# Patient Record
Sex: MALE | Race: BLACK OR AFRICAN AMERICAN
[De-identification: names, ages, dates, MRNs, and addresses within clinical notes are randomized per-mention and may not be internally consistent; named-entity substitution may affect disease eponyms.]

---

## 2019-07-11 ENCOUNTER — HOSPITAL ENCOUNTER (EMERGENCY)
Dept: HOSPITAL 62 - ER | Age: 49
Discharge: HOME | End: 2019-07-11
Payer: SELF-PAY

## 2019-07-11 VITALS — DIASTOLIC BLOOD PRESSURE: 99 MMHG | SYSTOLIC BLOOD PRESSURE: 151 MMHG

## 2019-07-11 DIAGNOSIS — R20.0: ICD-10-CM

## 2019-07-11 DIAGNOSIS — R51: ICD-10-CM

## 2019-07-11 DIAGNOSIS — I10: ICD-10-CM

## 2019-07-11 DIAGNOSIS — M25.512: Primary | ICD-10-CM
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
## 2019-07-11 PROCEDURE — 99283 EMERGENCY DEPT VISIT LOW MDM: CPT

## 2019-07-11 PROCEDURE — 73030 X-RAY EXAM OF SHOULDER: CPT

## 2019-07-11 PROCEDURE — 96372 THER/PROPH/DIAG INJ SC/IM: CPT

## 2019-07-11 PROCEDURE — 73010 X-RAY EXAM OF SHOULDER BLADE: CPT

## 2019-07-11 NOTE — ER DOCUMENT REPORT
HPI





- HPI


Time Seen by Provider: 07/11/19 11:44


Pain Level: 5


Context: 





Patient is a 49-year-old male who presents to the emergency department with a 

chief complaint of left scapula and left arm numbness and tingling.  Patient 

states that this has been ongoing for over 6 months.  Patient states he works in

[employer] and does a lot of heavy repetitive lifting.  Patient states that 

movement makes the left shoulder and scapula pain worse.  Patient states that 

when he lifts his arm above his head it seems to improve the numbness and 

tingling as well as the pain.  Patient states he is attempted to use Flexeril as

well as Tylenol and ibuprofen with minimal relief.  Patient states he has 

continued to work in [employer] daily.  Patient states he does have a history of 

chronic low back pain in which she sees [facility] surgery and receives

injections.  Patient denies neck or upper back surgery in the past.  Patient 

denies a specific injury or fall.





- CONSTITUTIONAL


Constitutional: DENIES: Fever, Chills





- EENT


EENT: DENIES: Sore Throat, Ear Pain, Eye problems





- NEURO


Neurology: REPORTS: Headache.  DENIES: Weakness, Vision blurred, Dizzinesss / 

Vertigo





- CARDIOVASCULAR


Cardiovascular: DENIES: Chest pain





- RESPIRATORY


Respiratory: DENIES: Trouble Breathing, Coughing





- GASTROINTESTINAL


Gastrointestinal: DENIES: Abdominal Pain, Black / Bloody Stools





- URINARY


Urinary: DENIES: Dysuria, Urgency, Frequency





- REPRODUCTIVE


Reproductive: DENIES: Pregnant:





- MUSCULOSKELETAL


Musculoskeletal: REPORTS: Extremity pain





Past Medical History





- General


Information source: Patient





- Social History


Smoking Status: Unknown if Ever Smoked


Chew tobacco use (# tins/day): No


Frequency of alcohol use: 1- 2 PER DAY


Drug Abuse: None


Family History: None, Reviewed & Not Pertinent


Patient has suicidal ideation: No


Patient has homicidal ideation: No





- Past Medical History


Cardiac Medical History: Reports: Hx Hypertension


   Denies: Hx Congestive Heart Failure, Hx Heart Attack


Pulmonary Medical History: Reports: None


   Denies: Hx Asthma, Hx Bronchitis, Hx COPD, Hx Pneumonia, Hx Tuberculosis


EENT Medical History: Reports: None


Neurological Medical History: Reports: None.  Denies: Hx Seizures


Endocrine Medical History: Reports: None


Renal/ Medical History: Reports: None.  Denies: Hx Benign Prostatic H

yperplasia, Hx End Stage Renal Disease, Hx Kidney Stones, Hx Peritoneal Dialysis


Malignancy Medical History: Reports None


GI Medical History: Reports: None.  Denies: Hx Cirrhosis, Hx Gastroesophageal 

Reflux Disease, Hx Ulcer


Musculoskeletal Medical History: Reports None, Denies Hx Arthritis, Denies Hx 

Multiple Sclerosis


Skin Medical History: Reports None


Psychiatric Medical History: Reports: None


   Denies: Hx Bipolar Disorder, Hx Depression, Hx Schizophrenia


Traumatic Medical History: Reports: None


Infectious Medical History: Reports: None


Surgical Hx: Negative


Past Surgical History: Reports: Hx Inguinal Hernia - right





- Immunizations


Hx Diphtheria, Pertussis, Tetanus Vaccination: Yes - unk





Vertical Provider Document





- CONSTITUTIONAL


Agree With Documented VS: Yes


Exam Limitations: No Limitations


General Appearance: No Apparent Distress


Notes: 





GENERAL: Well-appearing, well-nourished and in no acute distress.


HEAD: Atraumatic, normocephalic.


EYES: Pupils equal round and reactive to light, extraocular movements intact, 

sclera anicteric, conjunctiva are normal.


ENT: TMs normal, nares patent, oropharynx clear without exudates.  Moist mucous 

membranes.


NECK: Normal range of motion, supple without lymphadenopathy or JVD.


LUNGS: Breath sounds clear to auscultation bilaterally and equal.  No wheezes 

rales or rhonchi.


HEART: Regular rate and rhythm without murmurs, rubs or gallops.


ABDOMEN: Soft, nontender, normoactive bowel sounds.  No guarding, no rebound.  

No masses appreciated.


BACK: No cervical, thoracic, lumbar midline tenderness.  No saddle anesthesia, 

normal distal neurovascular exam.


GENITOURINARY: Deferred.


EXTREMITIES: PROM performed -  pain with palpation over the infraspinatus 

muscle.   


NEUROLOGICAL: Cranial nerves II through XII grossly intact.  Normal speech, 

normal gait.


PSYCH: Normal mood, normal affect.


SKIN: Warm, Dry, normal turgor, no rashes or lesions noted.





- INFECTION CONTROL


TRAVEL OUTSIDE OF THE [place] IN LAST 30 DAYS: No





Course





- Vital Signs


Vital signs: 


                                        











Temp Pulse Resp BP Pulse Ox


 


 97.5 F   79   18   151/99 H  98 


 


 07/11/19 11:26  07/11/19 11:26  07/11/19 11:26  07/11/19 11:26  07/11/19 11:26














Discharge





- Discharge


Clinical Impression: 


Shoulder pain


Qualifiers:


 Chronicity: chronic Laterality: left Qualified Code(s): M25.512 - Pain in left 

shoulder





Condition: Stable


Disposition: HOME, SELF-CARE


Additional Instructions: 


Today you were seen in the emergency department for left shoulder pain and left 

scapula pain that has been ongoing for 6 months.  The x-ray of your shoulder and

scapula were negative for fracture or dislocation.  It does appear that you have

moderate arthritis to the left glenohumeral joint of the shoulder.  This could 

be contributing to your discomfort.  You need to follow-up with [facility] 

for surgery as they are your established orthopedic for management and further 

evaluation of this discomfort as it appears to be chronic.  This could 

potentially be a rotator cuff injury or other injury that may need surgical 

repair to improve your symptoms.  Please rest the shoulder as much as possible. 

Although it does hurt please move your joints frequently throughout the day to 

maintain adequate range of motion.  You can use warm compresses to the site.  

Arthritis is typically treated with anti-inflammatory such as Aleve, ibuprofen 

or any other NSAID.





Arthritis





     Your symptoms are due to arthritis.  Arthritis is an inflammation of the 

joints.  There are many types -- osteoarthritis (due to "wear and tear"), auto-

immmune arthritis (such as rheumatoid, lupus, Maryse's, and others), and 

crystal-induced arthritis (such as gout and pseudogout).  The physician's 

examination, combined with laboratory tests, will determine the cause of your 

arthritis.


     All types of arthritis are treated with antiinflammatory medications.  

Other medication may be required for special types of arthritis, or if your 

problem does not respond to the antiinflammatory medicine.


     Local warmth may be helpful.  Move the involved joints through the full 

range of motion daily.  Mild exercise is usually still possible for most persons

with arthritis (ask your physician).  Swimming provides good exercise without 

damaging the joints.


     Contact the physician if you are worsening in any way.











Shoulder Injury





     You have injured your shoulder.  This usually results from stretching or 

tearing of the tendons during trauma.  Time and protection are required in order

to heal properly.  Many injuries are quite disabling, and should be taken 

seriously.


     Initial treatment includes cold packs and a sling to rest the shoulder.  

The physician has assessed the seriousness of your injury, and has outlined a 

treatment plan.  Understand that this treatment may change, depending on how you

progress.


     If a re-examination was recommended, it is important that you follow up as 

instructed.  Some shoulder injuries (such as partial tear of the rotator cuff) 

are only suspected after you've failed to improve.


Call us if there's severe pain, numbness, or loss of function.

## 2019-07-11 NOTE — RADIOLOGY REPORT (SQ)
EXAM DESCRIPTION:  SHOULDER LEFT 2 OR MORE VIEWS; SCAPULA LEFT



COMPLETED DATE/TIME:  7/11/2019 12:49 pm



REASON FOR STUDY:  left shoulder pain



COMPARISON:  None.



NUMBER OF VIEWS:  Three views.



TECHNIQUE:  Internal rotation, external rotation, and Y view images acquired of the left shoulder and
 left scapula.



LIMITATIONS:  None.



FINDINGS:  MINERALIZATION: Normal.

BONES: No acute fracture.  No worrisome bone lesions.

JOINTS: No dislocation.  Moderate arthrosis of the left glenohumeral joint with inferior osteophytes.
  The acromioclavicular joint is preserved.

VISUALIZED LUNGS AND RIBS: No pneumothorax.  No rib fracture.

SOFT TISSUES: No radiopaque foreign body.

OTHER: No other significant finding.



IMPRESSION:  1.  No fracture or dislocation of the left shoulder.  Moderate arthrosis of the left gle
nohumeral joint.

2.  No radiographic abnormality of the scapula.



TECHNICAL DOCUMENTATION:  JOB ID:  [id number]

 2011 [employer]- All Rights Reserved



Reading location - IP/workstation name: [email or web address]

## 2019-07-11 NOTE — RADIOLOGY REPORT (SQ)
EXAM DESCRIPTION:  SHOULDER LEFT 2 OR MORE VIEWS; SCAPULA LEFT



COMPLETED DATE/TIME:  7/11/2019 12:49 pm



REASON FOR STUDY:  left shoulder pain



COMPARISON:  None.



NUMBER OF VIEWS:  Three views.



TECHNIQUE:  Internal rotation, external rotation, and Y view images acquired of the left shoulder and
 left scapula.



LIMITATIONS:  None.



FINDINGS:  MINERALIZATION: Normal.

BONES: No acute fracture.  No worrisome bone lesions.

JOINTS: No dislocation.  Moderate arthrosis of the left glenohumeral joint with inferior osteophytes.
  The acromioclavicular joint is preserved.

VISUALIZED LUNGS AND RIBS: No pneumothorax.  No rib fracture.

SOFT TISSUES: No radiopaque foreign body.

OTHER: No other significant finding.



IMPRESSION:  1.  No fracture or dislocation of the left shoulder.  Moderate arthrosis of the left gle
nohumeral joint.

2.  No radiographic abnormality of the scapula.



TECHNICAL DOCUMENTATION:  JOB ID:  [id number]

 2011 [employer]- All Rights Reserved



Reading location - IP/workstation name: [email or web address]

## 2020-03-31 ENCOUNTER — HOSPITAL ENCOUNTER (EMERGENCY)
Dept: HOSPITAL 62 - ER | Age: 50
Discharge: HOME | End: 2020-03-31
Payer: COMMERCIAL

## 2020-03-31 VITALS — SYSTOLIC BLOOD PRESSURE: 148 MMHG | DIASTOLIC BLOOD PRESSURE: 116 MMHG

## 2020-03-31 DIAGNOSIS — M25.512: ICD-10-CM

## 2020-03-31 DIAGNOSIS — M54.2: ICD-10-CM

## 2020-03-31 DIAGNOSIS — R20.0: ICD-10-CM

## 2020-03-31 DIAGNOSIS — I10: ICD-10-CM

## 2020-03-31 DIAGNOSIS — R20.2: ICD-10-CM

## 2020-03-31 DIAGNOSIS — M47.812: Primary | ICD-10-CM

## 2020-03-31 DIAGNOSIS — M79.18: ICD-10-CM

## 2020-03-31 PROCEDURE — 72050 X-RAY EXAM NECK SPINE 4/5VWS: CPT

## 2020-03-31 PROCEDURE — 99283 EMERGENCY DEPT VISIT LOW MDM: CPT

## 2020-03-31 PROCEDURE — 96372 THER/PROPH/DIAG INJ SC/IM: CPT

## 2020-03-31 NOTE — ER DOCUMENT REPORT
ED Extremity Problem, Upper





- General


Chief Complaint: Shoulder Injury


Stated Complaint: LEFT SHOULDER/NECKPAIN


Time Seen by Provider: 03/31/20 08:29


Notes: 





CHIEF COMPLAINT: Neck pain





HPI: 50-year-old male who is left-hand dominant presenting for pain in the left 

neck and trapezius and shoulder region with radiation of numbness tingling down 

the left arm to the fingers.  Patient works as a [employer].  He states that 

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

shoveling.  Patient states that he woke up today with pain through the left 

trapezius neck and shoulder region with some numbness tingling in the left arm. 

No specific or focal trauma.





ROS: See HPI - all other systems were reviewed and are otherwise negative


Constitutional: no fever 


Eyes: no drainage, no blurred vision


ENT: no runny nose, no sore throat


Cardiovascular:  no chest pain 


Resp: no SOB, no cough


Integumentary: no rash 


Allergy: no hives 


Musculoskeletal: no extremity pain or swelling, positive neck pain


Neurological: + numbness/tingling, no weakness





MEDICATIONS: I agree with the patient medications as charted by the RN.





ALLERGIES: I agree with the allergies as charted by the RN.





PAST MEDICAL HISTORY/PAST SURGICAL HISTORY: Reviewed and agree as charted by RN.





SOCIAL HISTORY: Reviewed and agree as charted by RN.





FAMILY HISTORY: No significant familial comorbid conditions directly related to 

patient complaint





EXAM:


Reviewed vital signs as charted by RN.


CONSTITUTIONAL: Alert and oriented and responds appropriately to questions. 

Well-appearing; well-nourished


HEAD: Normocephalic; atraumatic


EYES: PERRL; Conjunctivae clear, sclerae non-icteric


ENT: normal nose; no rhinorrhea; moist mucous membranes; pharynx without lesions

noted, no uvula edema or deviation, no tonsillar hypertrophy, phonation normal


NECK: Supple without meningismus; no cervical lymphadenopathy, no masses.  There

is tenderness to the left trapezius region and the left paraspinous cervical 

musculature.


CARD: RRR; no murmurs, no clicks, no rubs, no gallops; symmetric distal pulses


RESP: Normal chest excursion without splinting or tachypnea; breath sounds clear

and equal bilaterally; no wheezes, no rhonchi, no rales, pulse oximetry 


ABD/GI: Normal bowel sounds; non-distended; soft, non-tender, no rebound, no 

guarding; no palpable organomegaly or masses.


BACK:  The back appears normal and is non-tender to palpation, there is no CVA 

tenderness


EXT: Normal ROM in all joints; non-tender to palpation; no cyanosis, no 

effusions, no edema   


SKIN: Normal color for age and race; warm; dry; good turgor; no acute lesions 

noted


NEURO: Moves all extremities equally; Motor and sensory function intact.  

Patient reports some decreased sensation to all fingers of the left hand to 

touch but strength is equal 5/5 bilateral upper extremities.


PSYCH: The patient's mood and manner are appropriate. Grooming and personal 

hygiene are appropriate.





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 

shoveling.  Will obtain x-ray to evaluate for Mack shovelers fracture although I

have lower suspicion as he does not have significant bony tenderness.


TRAVEL OUTSIDE OF THE [place] IN LAST 30 DAYS: No





- Related Data


Allergies/Adverse Reactions: 


                                        





No Known Allergies Allergy (Verified 03/31/20 08:09)


   











Past Medical History





- Social History


Smoking Status: Never Smoker


Chew tobacco use (# tins/day): No


Frequency of alcohol use: Occasional


Drug Abuse: None


Family History: None, Reviewed & Not Pertinent


Patient has suicidal ideation: No


Patient has homicidal ideation: No





- Past Medical History


Cardiac Medical History: Reports: Hx Hypertension


   Denies: Hx Congestive Heart Failure, Hx Heart Attack


Pulmonary Medical History: 


   Denies: Hx Asthma, Hx Bronchitis, Hx COPD, Hx Pneumonia, Hx Tuberculosis


Neurological Medical History: Denies: Hx Seizures, Hx Parkinson's Disease


Renal/ Medical History: Denies: Hx Benign Prostatic Hyperplasia, Hx End Stage 

Renal Disease, Hx Kidney Stones, Hx Peritoneal Dialysis


GI Medical History: Denies: Hx Cirrhosis, Hx Gastroesophageal Reflux Disease, Hx

Ulcer


Musculoskeletal Medical History: Denies Hx Arthritis, Denies Hx Multiple Scl

erosis


Psychiatric Medical History: 


   Denies: Hx Bipolar Disorder, Hx Depression, Hx Schizophrenia


Past Surgical History: Reports: Hx Inguinal Hernia - right





- Immunizations


Hx Diphtheria, Pertussis, Tetanus Vaccination: Yes - unk





Physical Exam





- Vital signs


Vitals: 


                                        











Temp Pulse Resp BP Pulse Ox


 


 97.5 F   72   20   158/108 H  98 


 


 03/31/20 08:05  03/31/20 08:05  03/31/20 08:05  03/31/20 08:05  03/31/20 08:05














Course





- Re-evaluation


Re-evalutation: 





03/31/20 09:05


X-ray imaging shows evidence of arthritis.  No other acute findings.  Will 

discharge home steroids pain medication anti-inflammatories referral to 

orthopedics





- Vital Signs


Vital signs: 


                                        











Temp Pulse Resp BP Pulse Ox


 


 97.5 F   72   20   158/108 H  98 


 


 03/31/20 08:05  03/31/20 08:05  03/31/20 08:05  03/31/20 08:05  03/31/20 08:05














Discharge





- Discharge


Clinical Impression: 


 Cervical radiculopathy





Condition: Stable


Disposition: HOME, SELF-CARE


Additional Instructions: 


Your x-ray today shows evidence of arthritis.  You likely have a pinched nerve 

in the neck causing the symptoms in the left arm.  Take the medications as 

prescribed no driving or heavy machinery use if utilizing muscle relaxers.  

Follow-up closely with orthopedics for reevaluation of symptoms


Prescriptions: 


Prednisone [Deltasone 20 mg Tablet] 2 tab PO DAILY 5 Days #10 tablet


Cyclobenzaprine HCl [Flexeril 10 mg Tablet] 10 mg PO TIDP PRN #15 tab


 PRN Reason: 


Diclofenac Sodium [Voltaren 50 Mg Tablet.] 50 mg PO BID #20 tablet.


Forms:  Return to Work


Referrals: 


ANTONELLA LARRY DO [ACTIVE STAFF] - Follow up as needed

## 2020-03-31 NOTE — RADIOLOGY REPORT (SQ)
EXAM DESCRIPTION:  CERV SP 4 OR 5 VIEWS



IMAGES COMPLETED DATE/TIME:  3/31/2020 8:53 am



REASON FOR STUDY:  pain



COMPARISON:  None.



NUMBER OF VIEWS:  Five views including obliques.



TECHNIQUE:  AP, lateral, obliques and odontoid radiographic images acquired of the cervical spine.



LIMITATIONS:  None.



FINDINGS:  MINERALIZATION: Normal.

ALIGNMENT: There is slight retrolisthesis of C2 on C3 and C3 on C4.  There is reversal of the normal 
cervical doses centered at the C4-5 level.

VERTEBRAE: Some loss of height at C5 and C6 most likely degenerative in nature.

DISCS: Multilevel disc space narrowing with osteophytes.

POSTERIOR ELEMENTS: Pedicles and facets are intact.  No posterior arch defects.

Facet arthropathy is present.

FORAMINA: No high-grade foraminal stenosis is appreciated.  Mild foraminal narrowing on the right at 
C3-4 and C5-C6.

HARDWARE: None in the spine.

PARASPINAL SOFT TISSUES: Normal.

OTHER: No other significant finding.



IMPRESSION:  SPONDYLOSIS WITHOUT BONE LESION OR FRACTURE.



TECHNICAL DOCUMENTATION:  JOB ID:  [id number]

 2011 [employer]- All Rights Reserved



Reading location - IP/workstation name: [email or web address]